# Patient Record
Sex: MALE | Race: WHITE | Employment: FULL TIME | URBAN - METROPOLITAN AREA
[De-identification: names, ages, dates, MRNs, and addresses within clinical notes are randomized per-mention and may not be internally consistent; named-entity substitution may affect disease eponyms.]

---

## 2017-02-01 ENCOUNTER — HOSPITAL ENCOUNTER (EMERGENCY)
Age: 32
Discharge: HOME | End: 2017-02-01
Attending: EMERGENCY MEDICINE | Admitting: EMERGENCY MEDICINE
Payer: COMMERCIAL

## 2017-02-01 VITALS
OXYGEN SATURATION: 98 % | SYSTOLIC BLOOD PRESSURE: 135 MMHG | HEART RATE: 83 BPM | RESPIRATION RATE: 16 BRPM | HEIGHT: 74 IN | TEMPERATURE: 98.5 F | DIASTOLIC BLOOD PRESSURE: 81 MMHG | BODY MASS INDEX: 25.31 KG/M2 | WEIGHT: 197.25 LBS

## 2017-02-01 DIAGNOSIS — L03.113 CELLULITIS OF RIGHT UPPER ARM: Primary | ICD-10-CM

## 2017-02-01 PROCEDURE — 99282 EMERGENCY DEPT VISIT SF MDM: CPT

## 2017-02-01 RX ORDER — DOXYCYCLINE HYCLATE 100 MG
100 TABLET ORAL 2 TIMES DAILY
Qty: 20 TAB | Refills: 0 | Status: SHIPPED | OUTPATIENT
Start: 2017-02-01 | End: 2017-02-11

## 2017-02-02 NOTE — DISCHARGE INSTRUCTIONS

## 2017-02-02 NOTE — ED TRIAGE NOTES
Pt stated he has cellulitis, pt with small golf ball size area of redness noted under right upper arm, denies fever

## 2017-02-02 NOTE — ED PROVIDER NOTES
HPI Comments: 35 yo male with hx of eczema and cellulitis here for evaluation of redness to right upper arm. States a few weeks ago he had \"cellulitis\" to arm and was treated with Augmentin; symptoms improved then returned yesterday. Non tender; full ROM. No streaking. Former smoker. Patient is a 36 y.o. male presenting with skin problem. The history is provided by the patient. Skin Problem    The current episode started yesterday. There has been no fever. The rash is present on the right arm. The pain is at a severity of 1/10. The pain is mild. Pertinent negatives include no blisters and no weeping. He has tried nothing for the symptoms. Past Medical History:   Diagnosis Date    Cellulitis     Eczema        History reviewed. No pertinent past surgical history. History reviewed. No pertinent family history. Social History     Social History    Marital status: SINGLE     Spouse name: N/A    Number of children: N/A    Years of education: N/A     Occupational History    Not on file. Social History Main Topics    Smoking status: Former Smoker     Quit date: 7/9/2013    Smokeless tobacco: Not on file    Alcohol use 0.0 oz/week     0 Standard drinks or equivalent per week      Comment: 1-3 drinks a day    Drug use: No      Comment: uses small amout marijuana every night    Sexual activity: Yes     Partners: Female      Comment: lives with girlfiriend & they have 3 kids     Other Topics Concern    Not on file     Social History Narrative         ALLERGIES: Bactrim [sulfamethoprim] and Nystatin    Review of Systems   Constitutional: Negative for activity change and fever. HENT: Negative for facial swelling. Eyes: Negative for discharge. Respiratory: Negative for cough. Cardiovascular: Negative for leg swelling. Gastrointestinal: Negative for abdominal distention. Skin: Positive for color change. Neurological: Negative for seizures and syncope. Psychiatric/Behavioral: Negative for behavioral problems. Vitals:    02/01/17 1934   BP: 135/81   Pulse: 83   Resp: 16   Temp: 98.5 °F (36.9 °C)   SpO2: 98%   Weight: 89.5 kg (197 lb 4 oz)   Height: 6' 2\" (1.88 m)            Physical Exam   Constitutional: He is oriented to person, place, and time. He appears well-nourished. HENT:   Head: Normocephalic and atraumatic. Eyes: Pupils are equal, round, and reactive to light. Neck: Normal range of motion. Cardiovascular: Normal rate and regular rhythm. Pulmonary/Chest: Effort normal and breath sounds normal.   Abdominal: Soft. There is no tenderness. Musculoskeletal: Normal range of motion. He exhibits no edema or tenderness. Neurological: He is alert and oriented to person, place, and time. Skin: Skin is warm and dry. There is erythema. Psychiatric: He has a normal mood and affect. Nursing note and vitals reviewed. MDM  Number of Diagnoses or Management Options  Cellulitis of right upper arm:     ED Course       Procedures    Patient's results have been reviewed with them. Patient and/or family have verbally conveyed their understanding and agreement of the patient's signs, symptoms, diagnosis, treatment and prognosis and additionally agree to follow up as recommended or return to the Emergency Room should their condition change prior to follow-up. Discharge instructions have also been provided to the patient with some educational information regarding their diagnosis as well a list of reasons why they would want to return to the ER prior to their follow-up appointment should their condition change.   ABRAM Griffith